# Patient Record
(demographics unavailable — no encounter records)

---

## 2024-12-19 NOTE — DEVELOPMENTAL MILESTONES
[Is dry day and night] : is not dry day and night [Tells a story with a beginning,] : tells a story with a beginning, a middle, and an end [Rides a standard bike] : rides a standard bike [Hops on one foot 3 to 4 times] : hops on one foot 3 to 4 times [Writes first and last name in] : writes first and last name in uppercase or lowercase letters

## 2024-12-19 NOTE — DISCUSSION/SUMMARY
[FreeTextEntry1] : 7 yo well, nml growth, BMI 95%, picky eater healthy nutrition discussed declined flu vaccine labs 2023 Continue balanced diet with all food groups. Brush teeth twice a day with toothbrush. Recommend visit to dentist. Help child to maintain consistent daily routines and sleep schedule. School discussed. Ensure home is safe. Teach child about personal safety. Use consistent, positive discipline. Limit screen time to no more than 2 hours per day. Encourage physical activity. Child needs to ride in a belt-positioning booster seat until  4 feet 9 inches has been reached and are between 8 and 12 years of age.   Return 1 year for routine well child check.

## 2024-12-19 NOTE — HISTORY OF PRESENT ILLNESS
[Father] : father [Fruit] : fruit [Meat] : meat [Grains] : grains [Eggs] : eggs [Fish] : fish [Dairy] : dairy [Vitamin] : Patient takes vitamin daily [Normal] : Normal [Brushing teeth] : Brushing teeth [Yes] : Patient goes to dentist yearly [Grade ___] : Grade [unfilled] [No difficulties with Homework] : No difficulties with homework [Adequate performance] : Adequate performance [Adequate attention] : Adequate attention [de-identified] : picky eater
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
